# Patient Record
Sex: MALE | Race: WHITE | NOT HISPANIC OR LATINO
[De-identification: names, ages, dates, MRNs, and addresses within clinical notes are randomized per-mention and may not be internally consistent; named-entity substitution may affect disease eponyms.]

---

## 2021-06-25 ENCOUNTER — APPOINTMENT (OUTPATIENT)
Dept: OTOLARYNGOLOGY | Facility: CLINIC | Age: 26
End: 2021-06-25
Payer: COMMERCIAL

## 2021-06-25 VITALS
SYSTOLIC BLOOD PRESSURE: 135 MMHG | WEIGHT: 180 LBS | BODY MASS INDEX: 24.38 KG/M2 | TEMPERATURE: 96.6 F | HEIGHT: 72 IN | HEART RATE: 80 BPM | DIASTOLIC BLOOD PRESSURE: 83 MMHG

## 2021-06-25 DIAGNOSIS — H90.5 UNSPECIFIED SENSORINEURAL HEARING LOSS: ICD-10-CM

## 2021-06-25 PROBLEM — Z00.00 ENCOUNTER FOR PREVENTIVE HEALTH EXAMINATION: Status: ACTIVE | Noted: 2021-06-25

## 2021-06-25 PROCEDURE — 99072 ADDL SUPL MATRL&STAF TM PHE: CPT

## 2021-06-25 PROCEDURE — 92570 ACOUSTIC IMMITANCE TESTING: CPT

## 2021-06-25 PROCEDURE — 99213 OFFICE O/P EST LOW 20 MIN: CPT

## 2021-06-25 PROCEDURE — 92557 COMPREHENSIVE HEARING TEST: CPT

## 2021-06-25 RX ORDER — METHYLPREDNISOLONE 4 MG/1
4 TABLET ORAL
Qty: 1 | Refills: 0 | Status: ACTIVE | COMMUNITY
Start: 2021-06-25 | End: 1900-01-01

## 2021-06-25 NOTE — DATA REVIEWED
[de-identified] : Pt seen for audio eval due to c.o tinnitus and clogged ear AD \par Tymps: Type A,  present reflexes, negative decay - AU\par Results obtained via insert earphones revealed:\par Right: WNL sloping to a moderately-severe SNHL rising to -8khz\par Left: -8khz\par Recs: 1) ENT f/u 2) Re-eval as per MD 3) regina-neuro w/up

## 2021-06-25 NOTE — ASSESSMENT
[FreeTextEntry1] :  ; ASNHL\par MRI\par LABS\par MEDROL DOSE PACK\par F/U 4 DAYS\par LOW SALT DIET\par MULTIVITAMINE

## 2021-06-25 NOTE — REASON FOR VISIT
[Subsequent Evaluation] : a subsequent evaluation for [FreeTextEntry2] : Right ear ringing and hard of hearing

## 2021-06-25 NOTE — REVIEW OF SYSTEMS
[Ear Pain] : ear pain [Ear Itch] : ear itch [Hearing Loss] : hearing loss [Dizziness] : dizziness [Vertigo] : vertigo [Ear Noises] : ear noises [Nasal Congestion] : nasal congestion [Recurrent Sinus Infections] : recurrent sinus infections [Negative] : Heme/Lymph [Patient Intake Form Reviewed] : Patient intake form was reviewed

## 2021-06-30 ENCOUNTER — APPOINTMENT (OUTPATIENT)
Dept: OTOLARYNGOLOGY | Facility: CLINIC | Age: 26
End: 2021-06-30
Payer: COMMERCIAL

## 2021-06-30 VITALS
BODY MASS INDEX: 24.38 KG/M2 | WEIGHT: 180 LBS | DIASTOLIC BLOOD PRESSURE: 81 MMHG | TEMPERATURE: 76 F | HEIGHT: 72 IN | SYSTOLIC BLOOD PRESSURE: 127 MMHG

## 2021-06-30 PROCEDURE — 92557 COMPREHENSIVE HEARING TEST: CPT

## 2021-06-30 PROCEDURE — 92567 TYMPANOMETRY: CPT

## 2021-06-30 PROCEDURE — 99072 ADDL SUPL MATRL&STAF TM PHE: CPT

## 2021-06-30 PROCEDURE — 99213 OFFICE O/P EST LOW 20 MIN: CPT

## 2021-06-30 NOTE — DATA REVIEWED
[de-identified] : f/u audio following tx for right sosnhl\par - Type A tymps au\par Hearing wnl [id number] au\par rec: 1) ent f/u 2) re-eval as per md

## 2021-06-30 NOTE — HISTORY OF PRESENT ILLNESS
[de-identified] : RIGHT EAR RINGING FOR A DAY/ NO HEARING LOSS/ HAS SOME EAR PRESSURE ON THE RIGHT/ IMPROVED HEARING/ FEELING GOOD\par SIMILAR EPISODE IN THE PAST 2018\par OTOLOGIC WORK UP HAS BEEN SUGGESTIVE OF MENIERS DISEASE LEFT EAR THEN\par NOW IS RIGHT EAR

## 2021-06-30 NOTE — ASSESSMENT
[FreeTextEntry1] :  WNL\par IMPROVED HEARING\par MENIERS DISEASE LIKELY\par PREVIOUS CHART TO BRING IT AND MAKE IT AVAILABLE\par F/U MRI AND LABS\par F/U 3 MONTHS

## 2021-07-08 ENCOUNTER — OUTPATIENT (OUTPATIENT)
Dept: OUTPATIENT SERVICES | Facility: HOSPITAL | Age: 26
LOS: 1 days | End: 2021-07-08
Payer: COMMERCIAL

## 2021-07-08 ENCOUNTER — APPOINTMENT (OUTPATIENT)
Dept: MRI IMAGING | Facility: CLINIC | Age: 26
End: 2021-07-08
Payer: COMMERCIAL

## 2021-07-08 DIAGNOSIS — H90.5 UNSPECIFIED SENSORINEURAL HEARING LOSS: ICD-10-CM

## 2021-07-08 PROCEDURE — 70553 MRI BRAIN STEM W/O & W/DYE: CPT | Mod: 26

## 2021-07-08 PROCEDURE — 70553 MRI BRAIN STEM W/O & W/DYE: CPT

## 2021-07-08 PROCEDURE — A9585: CPT

## 2021-07-09 ENCOUNTER — APPOINTMENT (OUTPATIENT)
Dept: OTOLARYNGOLOGY | Facility: CLINIC | Age: 26
End: 2021-07-09
Payer: COMMERCIAL

## 2021-07-09 VITALS
WEIGHT: 185 LBS | HEART RATE: 79 BPM | HEIGHT: 72 IN | SYSTOLIC BLOOD PRESSURE: 116 MMHG | DIASTOLIC BLOOD PRESSURE: 76 MMHG | BODY MASS INDEX: 25.06 KG/M2

## 2021-07-09 PROCEDURE — 99072 ADDL SUPL MATRL&STAF TM PHE: CPT

## 2021-07-09 PROCEDURE — 99213 OFFICE O/P EST LOW 20 MIN: CPT

## 2021-07-09 PROCEDURE — 92567 TYMPANOMETRY: CPT

## 2021-07-09 PROCEDURE — 92557 COMPREHENSIVE HEARING TEST: CPT

## 2021-07-09 NOTE — HISTORY OF PRESENT ILLNESS
[de-identified] : RIGHT EAR RINGING FOR A DAY/ NO HEARING LOSS/ HAS SOME EAR PRESSURE ON THE RIGHT/ IMPROVED HEARING/ FEELING GOOD\par SIMILAR EPISODE IN THE PAST 2018\par OTOLOGIC WORK UP HAS BEEN SUGGESTIVE OF MENIERS DISEASE LEFT EAR THEN\par NOW IS RIGHT EAR \par TINNITUS LEFT EAR FOR A FEW DAYS

## 2021-07-09 NOTE — ASSESSMENT
[FreeTextEntry1] :  WNL\par SYMPTOMS SUGGESTIVE OF MENIERS\par SOUND MASKING DISCUSSED\par F/U AFTER MRI

## 2021-07-09 NOTE — DATA REVIEWED
[de-identified] : Hx: Previously had sudden HL/ tinnitus AD 6/25/2021, recovered to WNL 6/30/2021;  tinnitus AS>AD as of this week\par -Type A tymps AU\par -Results obtained via insert earphones revealed hearing [id number] AU\par *No change re audio 6/30/2021*\par Recs: 1) ENT f/u 2) Re-eval as per MD

## 2021-07-17 ENCOUNTER — RESULT REVIEW (OUTPATIENT)
Age: 26
End: 2021-07-17

## 2021-07-17 ENCOUNTER — APPOINTMENT (OUTPATIENT)
Dept: CT IMAGING | Facility: CLINIC | Age: 26
End: 2021-07-17
Payer: COMMERCIAL

## 2021-07-17 ENCOUNTER — OUTPATIENT (OUTPATIENT)
Dept: OUTPATIENT SERVICES | Facility: HOSPITAL | Age: 26
LOS: 1 days | End: 2021-07-17
Payer: COMMERCIAL

## 2021-07-17 DIAGNOSIS — Z00.8 ENCOUNTER FOR OTHER GENERAL EXAMINATION: ICD-10-CM

## 2021-07-17 PROCEDURE — 70481 CT ORBIT/EAR/FOSSA W/DYE: CPT | Mod: 26

## 2021-07-17 PROCEDURE — 70481 CT ORBIT/EAR/FOSSA W/DYE: CPT

## 2022-08-23 ENCOUNTER — APPOINTMENT (OUTPATIENT)
Dept: OTOLARYNGOLOGY | Facility: CLINIC | Age: 27
End: 2022-08-23

## 2022-08-23 VITALS
HEART RATE: 91 BPM | WEIGHT: 185 LBS | HEIGHT: 72 IN | SYSTOLIC BLOOD PRESSURE: 125 MMHG | BODY MASS INDEX: 25.06 KG/M2 | DIASTOLIC BLOOD PRESSURE: 81 MMHG

## 2022-08-23 DIAGNOSIS — H81.09 MENIERE'S DISEASE, UNSPECIFIED EAR: ICD-10-CM

## 2022-08-23 DIAGNOSIS — H93.19 TINNITUS, UNSPECIFIED EAR: ICD-10-CM

## 2022-08-23 PROCEDURE — 92567 TYMPANOMETRY: CPT

## 2022-08-23 PROCEDURE — 92557 COMPREHENSIVE HEARING TEST: CPT

## 2022-08-23 PROCEDURE — 99213 OFFICE O/P EST LOW 20 MIN: CPT

## 2022-08-23 NOTE — HISTORY OF PRESENT ILLNESS
[de-identified] : TINNITUS JULISSA/ LEFT MORE THAN RIGHT\par SOUND SENSITIVITY\par SOMETIME FEELS EAR PRESSURE MOSTLY ON THE LEFT\par NO DIZZINESS

## 2022-08-23 NOTE — ASSESSMENT
[FreeTextEntry1] :  WNL\par COCHLEAR HYDROPS EXPLAINED\par MRI AND CT REVIEWED\par IAC ABNORMAL BONE UPTAKE EXPLAINED AND TO BE WATCHED\par WILL FOLLOW UP IN 6 MONTHS\par RISKS OF ANY SURGICAL PROCEDURE TO EXPLORE THE IAC BONY FINDINGS EXPLAINED/ NOT ADVISED AT PRESENT\par LOW SALT DIET\par AVOID STRESS DIET DISCUSSED\par

## 2024-08-28 ENCOUNTER — OFFICE (OUTPATIENT)
Dept: URBAN - METROPOLITAN AREA CLINIC 109 | Facility: CLINIC | Age: 29
Setting detail: OPHTHALMOLOGY
End: 2024-08-28
Payer: COMMERCIAL

## 2024-08-28 DIAGNOSIS — T15.11XA: ICD-10-CM

## 2024-08-28 PROBLEM — H10.45 ALLERGIC CONJUNCTIVITIS: Status: ACTIVE | Noted: 2024-08-28

## 2024-08-28 PROCEDURE — 65205 REMOVE FOREIGN BODY FROM EYE: CPT | Mod: RT | Performed by: OPHTHALMOLOGY

## 2024-08-28 ASSESSMENT — CONFRONTATIONAL VISUAL FIELD TEST (CVF)
OD_FINDINGS: FULL
OS_FINDINGS: FULL

## 2024-08-28 ASSESSMENT — LID EXAM ASSESSMENTS
OD_MEIBOMITIS: RUL T
OS_MEIBOMITIS: LUL T